# Patient Record
Sex: MALE | Race: WHITE | HISPANIC OR LATINO | Employment: UNEMPLOYED | URBAN - METROPOLITAN AREA
[De-identification: names, ages, dates, MRNs, and addresses within clinical notes are randomized per-mention and may not be internally consistent; named-entity substitution may affect disease eponyms.]

---

## 2021-08-26 ENCOUNTER — HOSPITAL ENCOUNTER (EMERGENCY)
Facility: MEDICAL CENTER | Age: 41
End: 2021-08-26
Attending: EMERGENCY MEDICINE

## 2021-08-26 VITALS
RESPIRATION RATE: 18 BRPM | HEART RATE: 74 BPM | DIASTOLIC BLOOD PRESSURE: 92 MMHG | OXYGEN SATURATION: 96 % | SYSTOLIC BLOOD PRESSURE: 130 MMHG | HEIGHT: 63 IN | TEMPERATURE: 98.6 F | BODY MASS INDEX: 30.7 KG/M2 | WEIGHT: 173.28 LBS

## 2021-08-26 DIAGNOSIS — J00 ACUTE RHINITIS: ICD-10-CM

## 2021-08-26 PROCEDURE — 99282 EMERGENCY DEPT VISIT SF MDM: CPT

## 2021-08-26 RX ORDER — FLUTICASONE PROPIONATE 50 MCG
1 SPRAY, SUSPENSION (ML) NASAL DAILY
Qty: 16 G | Refills: 0 | Status: SHIPPED | OUTPATIENT
Start: 2021-08-26

## 2021-08-26 ASSESSMENT — LIFESTYLE VARIABLES: DO YOU DRINK ALCOHOL: NO

## 2021-08-26 NOTE — ED NOTES
Pt discharged to home. Pt was given follow up instructions and prescriptions for flonase. Pt verbalized understanding of all instructions for discharge and is ambulatory out of ED with steady gait. AOx4

## 2021-08-26 NOTE — ED PROVIDER NOTES
"ED Provider Note    CHIEF COMPLAINT  Chief Complaint   Patient presents with   • Nasal Congestion     x1 month   • Eye Drainage     x1 day; itching to both eyes x1 week       HPI  Oskar Samuel Cox is a 41 y.o. male who presents with nasal congestion and eye drainage.  The patient states he said some intermittent nasal congestion and eye drainage.  He states over last couple of days it has returned.  He states he has a lot of lacrimal drainage as well as some eye pain.  He has been using some antibiotic eyedrops with no relief and some increase in erythema.  The patient has not had any associated fever.  The patient has had some congestion.  Does not have a headache.  Is not had any vomiting.    REVIEW OF SYSTEMS  No nausea or vomiting, no sore throat, no cough, no known sick contacts    PHYSICAL EXAM  VITAL SIGNS: /95   Pulse 77   Temp 37 °C (98.6 °F) (Temporal)   Resp 18   Ht 1.6 m (5' 2.99\")   Wt 78.6 kg (173 lb 4.5 oz)   SpO2 95%   BMI 30.70 kg/m²   In general the patient does not appear toxic    Eyes the patient is some bilateral conjunctival injection with no lacrimal drainage    Nares swollen turbinates    Oropharynx nonerythematous    Neck is supple without adenopathy    Pulmonary chest is clear to auscultation    Cardiovascular S1-S2 with regular rate and rhythm      COURSE & MEDICAL DECISION MAKING  Pertinent Labs & Imaging studies reviewed. (See chart for details)  This a 41-year-old gentleman who presents with rhinitis and lacrimal drainage.  The patient will stop the eyedrops as it can be causing some of the injection.  He will utilize cool compresses to his eyes.  We will treat the patient with Flonase and nasal saline spray.  Clinically do not appreciate any evidence of a focal bacterial infection.  His presenting symptoms could be due to the smoke versus the dryness of the air.  This could also be from a viral infection.  The patient does not appear toxic and a follow-up with " his primary care doctor in 7 to 10 days if he is not better and he will return to the emerge department acutely if he is worse.    FINAL IMPRESSION  1.  Bilateral conjunctivitis  2.  Rhinitis         Disposition  The patient will be discharged in stable condition      Electronically signed by: Bubba Lopez M.D., 8/26/2021 2:04 PM

## 2021-08-26 NOTE — ED TRIAGE NOTES
Chief Complaint   Patient presents with   • Nasal Congestion     x1 month   • Eye Drainage     x1 day; itching to both eyes x1 week     Pt ambulatory to triage for above complaint. Pt did not receive COVID vaccine. Pt speaks Kyrgyz only so  #[id number] used during triage process.    Pt is alert/oriented and follows commands. Pt speaking in full sentences and responds appropriately to questions. No acute distress noted in triage and respirations are even and unlabored.     Pt placed in lobby and educated on triage process. Pt encouraged to alert staff for any changes in condition.

## 2021-08-28 ENCOUNTER — APPOINTMENT (OUTPATIENT)
Dept: RADIOLOGY | Facility: MEDICAL CENTER | Age: 41
End: 2021-08-28
Attending: EMERGENCY MEDICINE

## 2021-08-28 ENCOUNTER — HOSPITAL ENCOUNTER (EMERGENCY)
Facility: MEDICAL CENTER | Age: 41
End: 2021-08-28
Attending: EMERGENCY MEDICINE

## 2021-08-28 VITALS
HEIGHT: 68 IN | TEMPERATURE: 98.6 F | SYSTOLIC BLOOD PRESSURE: 137 MMHG | BODY MASS INDEX: 26.46 KG/M2 | DIASTOLIC BLOOD PRESSURE: 90 MMHG | WEIGHT: 174.6 LBS | HEART RATE: 70 BPM | OXYGEN SATURATION: 97 % | RESPIRATION RATE: 18 BRPM

## 2021-08-28 DIAGNOSIS — B34.9 VIRAL ILLNESS: ICD-10-CM

## 2021-08-28 LAB
SARS-COV-2 RNA RESP QL NAA+PROBE: NOTDETECTED
SPECIMEN SOURCE: NORMAL

## 2021-08-28 PROCEDURE — 71045 X-RAY EXAM CHEST 1 VIEW: CPT

## 2021-08-28 PROCEDURE — U0005 INFEC AGEN DETEC AMPLI PROBE: HCPCS

## 2021-08-28 PROCEDURE — U0003 INFECTIOUS AGENT DETECTION BY NUCLEIC ACID (DNA OR RNA); SEVERE ACUTE RESPIRATORY SYNDROME CORONAVIRUS 2 (SARS-COV-2) (CORONAVIRUS DISEASE [COVID-19]), AMPLIFIED PROBE TECHNIQUE, MAKING USE OF HIGH THROUGHPUT TECHNOLOGIES AS DESCRIBED BY CMS-2020-01-R: HCPCS

## 2021-08-28 PROCEDURE — 99283 EMERGENCY DEPT VISIT LOW MDM: CPT

## 2021-08-28 RX ORDER — ALBUTEROL SULFATE 90 UG/1
2 AEROSOL, METERED RESPIRATORY (INHALATION) EVERY 6 HOURS PRN
Qty: 8.5 G | Refills: 0 | Status: SHIPPED | OUTPATIENT
Start: 2021-08-28

## 2021-08-28 ASSESSMENT — LIFESTYLE VARIABLES: DO YOU DRINK ALCOHOL: NO

## 2021-08-28 NOTE — ED NOTES
ipad translation used. Reviewed discharge instructions, pt verbalized understanding of instructions and medication rx at pharmacy. Educated on how to access mychart.  States he will schedule follow-up appointment. Denies further questions at this time. Pt ambulatory out of ER with steady gait.

## 2021-08-28 NOTE — ED PROVIDER NOTES
"ED Provider Note    CHIEF COMPLAINT  Chief Complaint   Patient presents with   • Nasal Congestion     x4-5 days   • Cough     dry   • Sore Throat       [facility]  Oskar Samuel Cox is a 41 y.o. male here for evaluation of nasal congestion and cough.  Patient states that he has had nasal congestion for about 4 days, and a dry cough.  He does complain of sore throat as well.  He has no fever or chills, but did have one episode of vomiting earlier today, earlier.  None since.  He has no chest pain or shortness of breath.  He has no abdominal pain.    ROS;  Please see HPI  O/W negative     PAST MEDICAL HISTORY   no bleeding disorders     SOCIAL HISTORY  Social History     Tobacco Use   • Smoking status: Never Smoker   • Smokeless tobacco: Never Used   Vaping Use   • Vaping Use: Never used   Substance and Sexual Activity   • Alcohol use: Yes     Comment: \"a beer a couple times a month\"   • Drug use: Never   • Sexual activity: Not on file       SURGICAL HISTORY  patient denies any surgical history    CURRENT MEDICATIONS  Home Medications    **Home medications have not yet been reviewed for this encounter**         ALLERGIES  No Known Allergies    REVIEW OF SYSTEMS  See HPI for further details. Review of systems as above, otherwise all other systems are negative.     PHYSICAL EXAM  VITAL SIGNS: /90   Pulse 70   Temp 37 °C (98.6 °F)   Resp 18   Ht 1.715 m (5' 7.5\")   Wt 79.2 kg (174 lb 9.7 oz)   SpO2 97%   BMI 26.94 kg/m²     Constitutional: Well developed, well nourished. No acute distress.  HEENT: Normocephalic, atraumatic. MMM  Neck: Supple, Full range of motion   Chest/Pulmonary:  No respiratory distress.  Equal expansion   Musculoskeletal: No deformity, no edema, neurovascular intact.   Neuro: Clear speech, appropriate, cooperative, cranial nerves II-XII grossly intact.  Psych: Normal mood and affect      PROCEDURES     MEDICAL RECORD  I have reviewed patient's medical record and pertinent results are " listed.    COURSE & MEDICAL DECISION MAKING  I have reviewed any medical record information, laboratory studies and radiographic results as noted above.    I you have had any blood pressure issues while here in the emergency department, please see your doctor for a further evaluation or work up.  DX-CHEST-LIMITED (1 VIEW)   Final Result      No radiographic evidence of acute cardiopulmonary process.        Results for orders placed or performed during the hospital encounter of 08/28/21   SARS-CoV-2 PCR (24 hour In-House): Collect NP swab in VTM    Specimen: Respirate   Result Value Ref Range    SARS-CoV-2 Source NP Swab        The pt will follow up on his covid swab, and return for any further issues or changes.  He has a negative cxr, and is nontoxic appearing, and afebrile.  All history vis interpretor.     Differential diagnoses include but not limited to: viral illness, covid,     This patient presents with viral illness .  At this time, I have counseled the patient/family regarding their medications, pain control, and follow up.  They will continue their medications, if any, as prescribed.  They will return immediately for any worsening symptoms and/or any other medical concerns.  They will see their doctor, or contact the doctor provided, in 1-2 days for follow up.                FINAL IMPRESSION  1. Viral illness             Electronically signed by: Ralph De Souza D.O., 8/28/2021 12:48 PM

## 2021-08-28 NOTE — ED TRIAGE NOTES
Oskar Cox  Chief Complaint   Patient presents with   • Nasal Congestion     x4-5 days   • Cough     dry   • Sore Throat     Pt ambulated to triage without difficulty. Mohawk speaking -  #[id number] used for triage. Pt c/o nasal/chest congestion, dry cough, sore throat 4-5 days. Denies COVID contacts. Not vaccinated.     Patient informed of triage process and to inform staff of any changes/worsening symptoms. Pt verbalized understanding. Pt denies concerns. Pt back to waiting room.     /100   Pulse 82   Temp 36.7 °C (98.1 °F) (Oral)   Resp 18   Wt 79.2 kg (174 lb 9.7 oz)   SpO2 98%

## 2021-08-28 NOTE — DISCHARGE INSTRUCTIONS
Please follow up on your covid results.  Stay at home until you have your results, and if positive, quarantine for 10 days.